# Patient Record
Sex: MALE | NOT HISPANIC OR LATINO | URBAN - METROPOLITAN AREA
[De-identification: names, ages, dates, MRNs, and addresses within clinical notes are randomized per-mention and may not be internally consistent; named-entity substitution may affect disease eponyms.]

---

## 2021-05-27 ENCOUNTER — RECORDS - HEALTHEAST (OUTPATIENT)
Dept: ADMINISTRATIVE | Facility: CLINIC | Age: 51
End: 2021-05-27

## 2023-02-20 ENCOUNTER — OFFICE VISIT (OUTPATIENT)
Dept: FAMILY MEDICINE | Facility: CLINIC | Age: 53
End: 2023-02-20

## 2023-02-20 VITALS
SYSTOLIC BLOOD PRESSURE: 154 MMHG | RESPIRATION RATE: 17 BRPM | WEIGHT: 153.3 LBS | OXYGEN SATURATION: 100 % | HEART RATE: 86 BPM | HEIGHT: 68 IN | BODY MASS INDEX: 23.23 KG/M2 | TEMPERATURE: 98.4 F | DIASTOLIC BLOOD PRESSURE: 83 MMHG

## 2023-02-20 DIAGNOSIS — F17.210 CIGARETTE NICOTINE DEPENDENCE WITHOUT COMPLICATION: ICD-10-CM

## 2023-02-20 DIAGNOSIS — Z78.9 LIVES IN GROUP HOME: ICD-10-CM

## 2023-02-20 DIAGNOSIS — Z12.11 SCREEN FOR COLON CANCER: ICD-10-CM
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
## 2023-02-20 DIAGNOSIS — Z87.898 HISTORY OF ALCOHOL USE DISORDER: ICD-10-CM

## 2023-02-20 DIAGNOSIS — Z11.3 ROUTINE SCREENING FOR STI (SEXUALLY TRANSMITTED INFECTION): ICD-10-CM

## 2023-02-20 DIAGNOSIS — H61.22 IMPACTED CERUMEN OF LEFT EAR: ICD-10-CM

## 2023-02-20 DIAGNOSIS — Z00.00 HEALTHCARE MAINTENANCE: Primary | ICD-10-CM

## 2023-02-20 DIAGNOSIS — Z12.5 SCREENING FOR PROSTATE CANCER: ICD-10-CM

## 2023-02-20 LAB
ALBUMIN SERPL BCG-MCNC: 4.7 G/DL (ref 3.5–5.2)
ALP SERPL-CCNC: 41 U/L (ref 40–129)
ALT SERPL W P-5'-P-CCNC: 28 U/L (ref 10–50)
ANION GAP SERPL CALCULATED.3IONS-SCNC: 14 MMOL/L (ref 7–15)
AST SERPL W P-5'-P-CCNC: 31 U/L (ref 10–50)
BASOPHILS # BLD AUTO: 0.1 10E3/UL (ref 0–0.2)
BASOPHILS NFR BLD AUTO: 1 %
BILIRUB SERPL-MCNC: 0.5 MG/DL
BUN SERPL-MCNC: 13.6 MG/DL (ref 6–20)
CALCIUM SERPL-MCNC: 10 MG/DL (ref 8.6–10)
CHLORIDE SERPL-SCNC: 103 MMOL/L (ref 98–107)
CREAT SERPL-MCNC: 0.89 MG/DL (ref 0.67–1.17)
DEPRECATED HCO3 PLAS-SCNC: 22 MMOL/L (ref 22–29)
EOSINOPHIL # BLD AUTO: 0.1 10E3/UL (ref 0–0.7)
EOSINOPHIL NFR BLD AUTO: 3 %
ERYTHROCYTE [DISTWIDTH] IN BLOOD BY AUTOMATED COUNT: 13.9 % (ref 10–15)
GFR SERPL CREATININE-BSD FRML MDRD: >90 ML/MIN/1.73M2
GLUCOSE SERPL-MCNC: 93 MG/DL (ref 70–99)
HCT VFR BLD AUTO: 40.5 % (ref 40–53)
HGB BLD-MCNC: 13.1 G/DL (ref 13.3–17.7)
IMM GRANULOCYTES # BLD: 0 10E3/UL
IMM GRANULOCYTES NFR BLD: 0 %
LYMPHOCYTES # BLD AUTO: 1.5 10E3/UL (ref 0.8–5.3)
LYMPHOCYTES NFR BLD AUTO: 39 %
MCH RBC QN AUTO: 31.9 PG (ref 26.5–33)
MCHC RBC AUTO-ENTMCNC: 32.3 G/DL (ref 31.5–36.5)
MCV RBC AUTO: 99 FL (ref 78–100)
MONOCYTES # BLD AUTO: 0.3 10E3/UL (ref 0–1.3)
MONOCYTES NFR BLD AUTO: 9 %
NEUTROPHILS # BLD AUTO: 1.8 10E3/UL (ref 1.6–8.3)
NEUTROPHILS NFR BLD AUTO: 48 %
NRBC # BLD AUTO: 0 10E3/UL
NRBC BLD AUTO-RTO: 0 /100
PLATELET # BLD AUTO: 381 10E3/UL (ref 150–450)
POTASSIUM SERPL-SCNC: 4.1 MMOL/L (ref 3.4–5.3)
PROT SERPL-MCNC: 7.8 G/DL (ref 6.4–8.3)
PSA SERPL-MCNC: 3.99 NG/ML (ref 0–3.5)
RBC # BLD AUTO: 4.11 10E6/UL (ref 4.4–5.9)
SODIUM SERPL-SCNC: 139 MMOL/L (ref 136–145)
WBC # BLD AUTO: 3.7 10E3/UL (ref 4–11)

## 2023-02-20 PROCEDURE — 87340 HEPATITIS B SURFACE AG IA: CPT | Performed by: NURSE PRACTITIONER

## 2023-02-20 PROCEDURE — 86708 HEPATITIS A ANTIBODY: CPT | Performed by: NURSE PRACTITIONER

## 2023-02-20 PROCEDURE — 86780 TREPONEMA PALLIDUM: CPT | Performed by: NURSE PRACTITIONER

## 2023-02-20 PROCEDURE — 86803 HEPATITIS C AB TEST: CPT | Performed by: NURSE PRACTITIONER

## 2023-02-20 PROCEDURE — 87389 HIV-1 AG W/HIV-1&-2 AB AG IA: CPT | Performed by: NURSE PRACTITIONER

## 2023-02-20 PROCEDURE — 86709 HEPATITIS A IGM ANTIBODY: CPT | Performed by: NURSE PRACTITIONER

## 2023-02-20 PROCEDURE — 86481 TB AG RESPONSE T-CELL SUSP: CPT | Performed by: NURSE PRACTITIONER

## 2023-02-20 PROCEDURE — 87491 CHLMYD TRACH DNA AMP PROBE: CPT | Performed by: NURSE PRACTITIONER

## 2023-02-20 PROCEDURE — 80053 COMPREHEN METABOLIC PANEL: CPT | Performed by: NURSE PRACTITIONER

## 2023-02-20 PROCEDURE — 87591 N.GONORRHOEAE DNA AMP PROB: CPT | Performed by: NURSE PRACTITIONER

## 2023-02-20 PROCEDURE — 85025 COMPLETE CBC W/AUTO DIFF WBC: CPT | Performed by: NURSE PRACTITIONER

## 2023-02-20 PROCEDURE — 86706 HEP B SURFACE ANTIBODY: CPT | Performed by: NURSE PRACTITIONER

## 2023-02-20 PROCEDURE — 86704 HEP B CORE ANTIBODY TOTAL: CPT | Performed by: NURSE PRACTITIONER

## 2023-02-20 PROCEDURE — G0103 PSA SCREENING: HCPCS | Performed by: NURSE PRACTITIONER

## 2023-02-20 ASSESSMENT — PATIENT HEALTH QUESTIONNAIRE - PHQ9
SUM OF ALL RESPONSES TO PHQ QUESTIONS 1-9: 4
5. POOR APPETITE OR OVEREATING: MORE THAN HALF THE DAYS

## 2023-02-20 ASSESSMENT — ANXIETY QUESTIONNAIRES
3. WORRYING TOO MUCH ABOUT DIFFERENT THINGS: MORE THAN HALF THE DAYS
IF YOU CHECKED OFF ANY PROBLEMS ON THIS QUESTIONNAIRE, HOW DIFFICULT HAVE THESE PROBLEMS MADE IT FOR YOU TO DO YOUR WORK, TAKE CARE OF THINGS AT HOME, OR GET ALONG WITH OTHER PEOPLE: SOMEWHAT DIFFICULT
GAD7 TOTAL SCORE: 8
1. FEELING NERVOUS, ANXIOUS, OR ON EDGE: MORE THAN HALF THE DAYS
7. FEELING AFRAID AS IF SOMETHING AWFUL MIGHT HAPPEN: NOT AT ALL
GAD7 TOTAL SCORE: 8
6. BECOMING EASILY ANNOYED OR IRRITABLE: NOT AT ALL
5. BEING SO RESTLESS THAT IT IS HARD TO SIT STILL: NOT AT ALL
2. NOT BEING ABLE TO STOP OR CONTROL WORRYING: MORE THAN HALF THE DAYS

## 2023-02-20 ASSESSMENT — ENCOUNTER SYMPTOMS
GASTROINTESTINAL NEGATIVE: 1
FEVER: 0
DIZZINESS: 0
EYES NEGATIVE: 1
RESPIRATORY NEGATIVE: 1
HEADACHES: 0
SORE THROAT: 0
ALLERGIC/IMMUNOLOGIC NEGATIVE: 1
MUSCULOSKELETAL NEGATIVE: 1
CHILLS: 0
ENDOCRINE NEGATIVE: 1
CARDIOVASCULAR NEGATIVE: 1

## 2023-02-20 ASSESSMENT — PAIN SCALES - GENERAL: PAINLEVEL: NO PAIN (0)
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
## 2023-02-20 NOTE — NURSING NOTE
"ROOM:2  REBECCA MCKEON    Preferred Name: Levar     How did you hear about us?  Other - RS Louisa    52 year old  Chief Complaint   Patient presents with     Physical     Just the physical no major concerns       Blood pressure (!) 153/90, pulse 86, temperature 98.4  F (36.9  C), temperature source Oral, resp. rate 17, height 1.735 m (5' 8.3\"), weight 69.5 kg (153 lb 4.8 oz), SpO2 100 %. Body mass index is 23.1 kg/m .  BP completed using cuff size:        There is no problem list on file for this patient.      Wt Readings from Last 2 Encounters:   02/20/23 69.5 kg (153 lb 4.8 oz)     BP Readings from Last 3 Encounters:   02/20/23 (!) 153/90       No Known Allergies    No current outpatient medications on file.     No current facility-administered medications for this visit.       Social History     Tobacco Use     Smoking status: Some Days     Types: Cigarettes     Smokeless tobacco: Never   Vaping Use     Vaping Use: Never used   Substance Use Topics     Alcohol use: Not Currently     Drug use: Not Currently       Honoring Choices - Health Care Directive Guide offered to patient at time of visit.    There are no preventive care reminders to display for this patient.      There is no immunization history on file for this patient.    No results found for: PAP    No lab results found.    PHQ-2 ( 1999 Pfizer) 2/20/2023   Q1: Little interest or pleasure in doing things 0   Q2: Feeling down, depressed or hopeless 2   PHQ-2 Score 2       PHQ-9 SCORE 2/20/2023   PHQ-9 Total Score 4       SIOMARA-7 SCORE 2/20/2023   Total Score 8       No flowsheet data found.    Rose Marie Roldan, EMT    February 20, 2023 10:10 AM    "

## 2023-02-21 LAB
C TRACH DNA SPEC QL NAA+PROBE: NEGATIVE
HAV IGG SER QL IA: NONREACTIVE
HAV IGM SERPL QL IA: NONREACTIVE
HBV CORE AB SERPL QL IA: NONREACTIVE
HBV SURFACE AB SERPL IA-ACNC: 0.61 M[IU]/ML
HBV SURFACE AB SERPL IA-ACNC: NONREACTIVE M[IU]/ML
HBV SURFACE AG SERPL QL IA: NONREACTIVE
HCV AB SERPL QL IA: NONREACTIVE
HIV 1+2 AB+HIV1 P24 AG SERPL QL IA: NONREACTIVE
N GONORRHOEA DNA SPEC QL NAA+PROBE: NEGATIVE
T PALLIDUM AB SER QL: NONREACTIVE

## 2023-02-21 NOTE — RESULT ENCOUNTER NOTE
Hi all,    This patient will need follow-up on 3/2/23 at the [facility]- thanks!

## 2023-02-22 LAB
GAMMA INTERFERON BACKGROUND BLD IA-ACNC: 0.02 IU/ML
M TB IFN-G BLD-IMP: NEGATIVE
M TB IFN-G CD4+ BCKGRND COR BLD-ACNC: 9.98 IU/ML
MITOGEN IGNF BCKGRD COR BLD-ACNC: 0.02 IU/ML
MITOGEN IGNF BCKGRD COR BLD-ACNC: 0.06 IU/ML
QUANTIFERON MITOGEN: 10 IU/ML
QUANTIFERON NIL TUBE: 0.02 IU/ML
QUANTIFERON TB1 TUBE: 0.04 IU/ML
QUANTIFERON TB2 TUBE: 0.08

## 2023-03-02 ENCOUNTER — OFFICE VISIT (OUTPATIENT)
Dept: FAMILY MEDICINE | Facility: CLINIC | Age: 53
End: 2023-03-02

## 2023-03-02 VITALS
RESPIRATION RATE: 18 BRPM | DIASTOLIC BLOOD PRESSURE: 86 MMHG | HEART RATE: 80 BPM | SYSTOLIC BLOOD PRESSURE: 138 MMHG | OXYGEN SATURATION: 99 %

## 2023-03-02 DIAGNOSIS — R97.20 ELEVATED PROSTATE SPECIFIC ANTIGEN (PSA): Primary | ICD-10-CM

## 2023-03-02 DIAGNOSIS — Z00.00 HEALTHCARE MAINTENANCE: ICD-10-CM

## 2023-03-02 DIAGNOSIS — R79.89 ABNORMAL COMPLETE BLOOD COUNT: ICD-10-CM

## 2023-03-02 DIAGNOSIS — R03.0 ELEVATED BP WITHOUT DIAGNOSIS OF HYPERTENSION: ICD-10-CM

## 2023-03-02 NOTE — PROGRESS NOTES
[facility] ACUTE OR FOLLOW UP APPOINTMENT    Patient: Levar Mclain YOB: 1970    Date of Exam: 3/02/23    Arrival Time: 09 32 AM  Departure Time: 09 41 AM    Please be advised that this client resides in a facility in which narcotic medications are not permitted. If pain management is needed, please prescribe an alternative medication.     Levar Mclain is a 52 year old who presents for the following: results, BP recheck.      HPI: 52-year-old male [facility] patient (past Drug of choice: cocaine (coke), marijuana, and alcohol) presents for BP check and lab review. Patient denies acute concerns/symptoms at time of exam.        Do you need any refills on your Medications today? No    Review of Systems   Constitutional, HEENT, cardiovascular, pulmonary, gi and gu systems are negative, except as otherwise noted.      General Physical Exam:  Vitals: /86 (BP Location: Right arm)   Pulse 80   Resp 18   SpO2 99%       Physical Exam  Constitutional:       General: He is not in acute distress.     Appearance: He is not ill-appearing.   Eyes:      Extraocular Movements: Extraocular movements intact.   Cardiovascular:      Rate and Rhythm: Normal rate.   Pulmonary:      Effort: Pulmonary effort is normal. No respiratory distress.   Musculoskeletal:      Cervical back: Neck supple.   Neurological:      General: No focal deficit present.      Mental Status: He is alert.   Psychiatric:         Thought Content: Thought content normal.         Judgment: Judgment normal.             If prescribed a controlled substance, may client take medication home when discharged from [facility]? No.    Additional Comments:N/A      Recent Results (from the past 504 hour(s))   Comprehensive metabolic panel    Collection Time: 02/20/23 11:19 AM   Result Value Ref Range    Sodium 139 136 - 145 mmol/L    Potassium 4.1 3.4 - 5.3 mmol/L    Chloride 103 98 - 107 mmol/L    Carbon Dioxide (CO2) 22 22 - 29 mmol/L    Anion Gap 14 7 - 15  mmol/L    Urea Nitrogen 13.6 6.0 - 20.0 mg/dL    Creatinine 0.89 0.67 - 1.17 mg/dL    Calcium 10.0 8.6 - 10.0 mg/dL    Glucose 93 70 - 99 mg/dL    Alkaline Phosphatase 41 40 - 129 U/L    AST 31 10 - 50 U/L    ALT 28 10 - 50 U/L    Protein Total 7.8 6.4 - 8.3 g/dL    Albumin 4.7 3.5 - 5.2 g/dL    Bilirubin Total 0.5 <=1.2 mg/dL    GFR Estimate >90 >60 mL/min/1.73m2   HIV Antigen Antibody Combo    Collection Time: 02/20/23 11:19 AM   Result Value Ref Range    HIV Antigen Antibody Combo Nonreactive Nonreactive   Hepatitis C Screen Reflex to HCV RNA Quant and Genotype    Collection Time: 02/20/23 11:19 AM   Result Value Ref Range    Hepatitis C Antibody Nonreactive Nonreactive   Hepatitis B core antibody    Collection Time: 02/20/23 11:19 AM   Result Value Ref Range    Hepatitis B Core Antibody Total Nonreactive Nonreactive   Hepatitis B surface antigen    Collection Time: 02/20/23 11:19 AM   Result Value Ref Range    Hepatitis B Surface Antigen Nonreactive Nonreactive   Hepatitis B Surface Antibody    Collection Time: 02/20/23 11:19 AM   Result Value Ref Range    Hepatitis B Surface Antibody Instrument Value 0.61 <8.00 m[IU]/mL    Hepatitis B Surface Antibody Nonreactive    Hepatitis A Antibody IgG    Collection Time: 02/20/23 11:19 AM   Result Value Ref Range    Hepatitis A Antibody IgG Nonreactive Nonreactive   Hepatitis A antibody IgM    Collection Time: 02/20/23 11:19 AM   Result Value Ref Range    Hepatitis A Antibody IgM Nonreactive Nonreactive   NEISSERIA GONORRHOEA PCR    Collection Time: 02/20/23 11:19 AM    Specimen: Urine, Voided   Result Value Ref Range    Neisseria gonorrhoeae Negative Negative   CHLAMYDIA TRACHOMATIS PCR    Collection Time: 02/20/23 11:19 AM    Specimen: Urine, Voided   Result Value Ref Range    Chlamydia trachomatis Negative Negative   Treponema Abs w Reflex to RPR and Titer    Collection Time: 02/20/23 11:19 AM   Result Value Ref Range    Treponema Antibody Total Nonreactive Nonreactive    Prostate spec antigen screen    Collection Time: 02/20/23 11:19 AM   Result Value Ref Range    Prostate Specific Antigen Screen 3.99 (H) 0.00 - 3.50 ng/mL   CBC with platelets and differential    Collection Time: 02/20/23 11:19 AM   Result Value Ref Range    WBC Count 3.7 (L) 4.0 - 11.0 10e3/uL    RBC Count 4.11 (L) 4.40 - 5.90 10e6/uL    Hemoglobin 13.1 (L) 13.3 - 17.7 g/dL    Hematocrit 40.5 40.0 - 53.0 %    MCV 99 78 - 100 fL    MCH 31.9 26.5 - 33.0 pg    MCHC 32.3 31.5 - 36.5 g/dL    RDW 13.9 10.0 - 15.0 %    Platelet Count 381 150 - 450 10e3/uL    % Neutrophils 48 %    % Lymphocytes 39 %    % Monocytes 9 %    % Eosinophils 3 %    % Basophils 1 %    % Immature Granulocytes 0 %    NRBCs per 100 WBC 0 <1 /100    Absolute Neutrophils 1.8 1.6 - 8.3 10e3/uL    Absolute Lymphocytes 1.5 0.8 - 5.3 10e3/uL    Absolute Monocytes 0.3 0.0 - 1.3 10e3/uL    Absolute Eosinophils 0.1 0.0 - 0.7 10e3/uL    Absolute Basophils 0.1 0.0 - 0.2 10e3/uL    Absolute Immature Granulocytes 0.0 <=0.4 10e3/uL    Absolute NRBCs 0.0 10e3/uL   Quantiferon TB Gold Plus Grey Tube    Collection Time: 02/20/23 11:19 AM    Specimen: Peripheral Blood   Result Value Ref Range    Quantiferon Nil Tube 0.02 IU/mL   Quantiferon TB Gold Plus Green Tube    Collection Time: 02/20/23 11:19 AM    Specimen: Peripheral Blood   Result Value Ref Range    Quantiferon TB1 Tube 0.04 IU/mL   Quantiferon TB Gold Plus Yellow Tube    Collection Time: 02/20/23 11:19 AM    Specimen: Peripheral Blood   Result Value Ref Range    Quantiferon TB2 Tube 0.08    Quantiferon TB Gold Plus Purple Tube    Collection Time: 02/20/23 11:19 AM    Specimen: Peripheral Blood   Result Value Ref Range    Quantiferon Mitogen 10.00 IU/mL   Quantiferon TB Gold Plus    Collection Time: 02/20/23 11:19 AM    Specimen: Peripheral Blood   Result Value Ref Range    Quantiferon-TB Gold Plus Negative Negative    TB1 Ag minus Nil Value 0.02 IU/mL    TB2 Ag minus Nil Value 0.06 IU/mL    Mitogen minus  Nil Result 9.98 IU/mL    Nil Result 0.02 IU/mL         Assessment / Plan:  1. Elevated prostate specific antigen (PSA)  Pt endorses having intercourse 2-3 days prior to PSA draw. He denies urinary hesitancy/frequency/dribbling and hematuria today. Will have him repeat the PSA in 2 months. Instructed patient to not have intercourse/ejaculate 4 days prior to repeat PSA. Disposition pending results.    - Prostate spec antigen screen; Future    2. Elevated BP without diagnosis of hypertension  BP closer to goal. Advised cardio/low Na diet.    3. Abnormal complete blood count  Pt denies symptoms including fevers, night sweats, fatigue etc. Will recheck CBC in 2 months.  - CBC with platelets differential; Future    4. Healthcare maintenance  Advised Hepatitis A/B vaccines.      Referrals Made:   NO REFERRALS MADE TODAY  If a referral was made to a [facility] and you don't get a call from central scheduling please call [phone].  If a Mammogram was ordered for you at [facility] call [phone] to schedule or change your appointment.  If you had an XRay/CT/Ultrasound/MRI ordered the number is [phone] to schedule or change your radiology appointment.     Follow-up in 2 months for repeat labs at [facility] clinic, sooner if needed    Medication changes made at today's visit: MEDICATIONS:        - Continue other medications without change    All questions/concerns addressed. Patient stated understanding/agreement to plan of care.    KITA Pryor, CNP  [facility] [employer]

## 2023-03-02 NOTE — PATIENT INSTRUCTIONS
Recent Results (from the past 504 hour(s))   Comprehensive metabolic panel    Collection Time: 02/20/23 11:19 AM   Result Value Ref Range    Sodium 139 136 - 145 mmol/L    Potassium 4.1 3.4 - 5.3 mmol/L    Chloride 103 98 - 107 mmol/L    Carbon Dioxide (CO2) 22 22 - 29 mmol/L    Anion Gap 14 7 - 15 mmol/L    Urea Nitrogen 13.6 6.0 - 20.0 mg/dL    Creatinine 0.89 0.67 - 1.17 mg/dL    Calcium 10.0 8.6 - 10.0 mg/dL    Glucose 93 70 - 99 mg/dL    Alkaline Phosphatase 41 40 - 129 U/L    AST 31 10 - 50 U/L    ALT 28 10 - 50 U/L    Protein Total 7.8 6.4 - 8.3 g/dL    Albumin 4.7 3.5 - 5.2 g/dL    Bilirubin Total 0.5 <=1.2 mg/dL    GFR Estimate >90 >60 mL/min/1.73m2   HIV Antigen Antibody Combo    Collection Time: 02/20/23 11:19 AM   Result Value Ref Range    HIV Antigen Antibody Combo Nonreactive Nonreactive   Hepatitis C Screen Reflex to HCV RNA Quant and Genotype    Collection Time: 02/20/23 11:19 AM   Result Value Ref Range    Hepatitis C Antibody Nonreactive Nonreactive   Hepatitis B core antibody    Collection Time: 02/20/23 11:19 AM   Result Value Ref Range    Hepatitis B Core Antibody Total Nonreactive Nonreactive   Hepatitis B surface antigen    Collection Time: 02/20/23 11:19 AM   Result Value Ref Range    Hepatitis B Surface Antigen Nonreactive Nonreactive   Hepatitis B Surface Antibody    Collection Time: 02/20/23 11:19 AM   Result Value Ref Range    Hepatitis B Surface Antibody Instrument Value 0.61 <8.00 m[IU]/mL    Hepatitis B Surface Antibody Nonreactive    Hepatitis A Antibody IgG    Collection Time: 02/20/23 11:19 AM   Result Value Ref Range    Hepatitis A Antibody IgG Nonreactive Nonreactive   Hepatitis A antibody IgM    Collection Time: 02/20/23 11:19 AM   Result Value Ref Range    Hepatitis A Antibody IgM Nonreactive Nonreactive   NEISSERIA GONORRHOEA PCR    Collection Time: 02/20/23 11:19 AM    Specimen: Urine, Voided   Result Value Ref Range    Neisseria gonorrhoeae Negative Negative   CHLAMYDIA  TRACHOMATIS PCR    Collection Time: 02/20/23 11:19 AM    Specimen: Urine, Voided   Result Value Ref Range    Chlamydia trachomatis Negative Negative   Treponema Abs w Reflex to RPR and Titer    Collection Time: 02/20/23 11:19 AM   Result Value Ref Range    Treponema Antibody Total Nonreactive Nonreactive   Prostate spec antigen screen    Collection Time: 02/20/23 11:19 AM   Result Value Ref Range    Prostate Specific Antigen Screen 3.99 (H) 0.00 - 3.50 ng/mL   CBC with platelets and differential    Collection Time: 02/20/23 11:19 AM   Result Value Ref Range    WBC Count 3.7 (L) 4.0 - 11.0 10e3/uL    RBC Count 4.11 (L) 4.40 - 5.90 10e6/uL    Hemoglobin 13.1 (L) 13.3 - 17.7 g/dL    Hematocrit 40.5 40.0 - 53.0 %    MCV 99 78 - 100 fL    MCH 31.9 26.5 - 33.0 pg    MCHC 32.3 31.5 - 36.5 g/dL    RDW 13.9 10.0 - 15.0 %    Platelet Count 381 150 - 450 10e3/uL    % Neutrophils 48 %    % Lymphocytes 39 %    % Monocytes 9 %    % Eosinophils 3 %    % Basophils 1 %    % Immature Granulocytes 0 %    NRBCs per 100 WBC 0 <1 /100    Absolute Neutrophils 1.8 1.6 - 8.3 10e3/uL    Absolute Lymphocytes 1.5 0.8 - 5.3 10e3/uL    Absolute Monocytes 0.3 0.0 - 1.3 10e3/uL    Absolute Eosinophils 0.1 0.0 - 0.7 10e3/uL    Absolute Basophils 0.1 0.0 - 0.2 10e3/uL    Absolute Immature Granulocytes 0.0 <=0.4 10e3/uL    Absolute NRBCs 0.0 10e3/uL   Quantiferon TB Gold Plus Grey Tube    Collection Time: 02/20/23 11:19 AM    Specimen: Peripheral Blood   Result Value Ref Range    Quantiferon Nil Tube 0.02 IU/mL   Quantiferon TB Gold Plus Green Tube    Collection Time: 02/20/23 11:19 AM    Specimen: Peripheral Blood   Result Value Ref Range    Quantiferon TB1 Tube 0.04 IU/mL   Quantiferon TB Gold Plus Yellow Tube    Collection Time: 02/20/23 11:19 AM    Specimen: Peripheral Blood   Result Value Ref Range    Quantiferon TB2 Tube 0.08    Quantiferon TB Gold Plus Purple Tube    Collection Time: 02/20/23 11:19 AM    Specimen: Peripheral Blood   Result  Value Ref Range    Quantiferon Mitogen 10.00 IU/mL   Quantiferon TB Gold Plus    Collection Time: 02/20/23 11:19 AM    Specimen: Peripheral Blood   Result Value Ref Range    Quantiferon-TB Gold Plus Negative Negative    TB1 Ag minus Nil Value 0.02 IU/mL    TB2 Ag minus Nil Value 0.06 IU/mL    Mitogen minus Nil Result 9.98 IU/mL    Nil Result 0.02 IU/mL

## 2023-05-12 ENCOUNTER — TELEPHONE (OUTPATIENT)
Dept: GASTROENTEROLOGY | Facility: CLINIC | Age: 53
End: 2023-05-12
Payer: COMMERCIAL

## 2023-05-12 ENCOUNTER — HOSPITAL ENCOUNTER (OUTPATIENT)
Facility: AMBULATORY SURGERY CENTER | Age: 53
End: 2023-05-12
Attending: SURGERY | Admitting: SURGERY
Payer: COMMERCIAL

## 2023-05-12 NOTE — TELEPHONE ENCOUNTER
Screening Questions  BLUE  KIND OF PREP RED  LOCATION [review exclusion criteria] GREEN  SEDATION TYPE    Y  Are you active on mychart?   REBECCA MCKEON  Ordering/Referring Provider?    [facility]  What type of coverage do you have?  N Have you had a positive covid test in the last 14 days?    24.8  1. BMI  [BMI 40+ - review exclusion criteria - [facility] + [facility]]            *NEED PAC APPT AT [facility] + [facility] (ONLY)*     SELF   2. Are you able to give consent for your medical care? [IF NO,RN REVIEW]          N  3. Are you taking any prescription pain medications on a routine schedule   (ex narcotics: tramadol, oxycodone, roxicodone, oxycontin,  and percocet)?    [RN Review]             N  3a. EXTENDED PREP What kind of prescription?     N 4. Do you have any chemical dependencies such as alcohol, street drugs, or methadone?    **If yes 3- 5 , please schedule with MAC sedation.**          IF YES TO ANY 6 - 10 - HOSPITAL SETTING ONLY.     N 6.   Do you need assistance transferring?     N 7.   Have you had a heart or lung transplant?    N 8.   Are you currently on dialysis?   N 9.   Do you use daily home oxygen?   N 10. Do you take nitroglycerin?   10a. N If yes, how often?     11. [FEMALES]   Are you currently pregnant?     11a.  If yes, how many weeks? [ Greater than 12 weeks, OR NEEDED]    N 12. [review exclusion criteria]  Do you have any implantable devices in your body (pacemaker, defib, LVAD)?            *NEED PAC APPT AT [facility]*     N 13. Do you have Pulmonary Hypertension?             *NEED PAC APPT AT [facility]*     N 14. In the past 6 months, have you had any heart related issues including cardiomyopathy or heart attack?     N 14a. If yes, did it require cardiac stenting if so when?     N 15. Have you had a stroke or Transient ischemic attack (TIA - aka  mini stroke ) within 6 months?      N 16. Do you have mod to severe Obstructive Sleep Apnea?  [Hospital only]    N 17. Do you have SEVERE AND UNCONTROLLED asthma?               "*NEED PAC APPT AT [facility]*     N 18.Do you take blood thinners?  No    N 19. Do you take the medication Phentermine?    19a. If yes, \"Hold for 7 days before procedure.  Please consult your prescribing provider if you have questions about holding this medication.\"     N  20. Do you have chronic kidney disease?      N  21. Do you have a diagnosis of diabetes?     N  22. On a regular basis do you go 3-5 days between bowel movements?     23. Preferred LOCAL Pharmacy for Pre Prescription    [ LIST ONLY ONE PHARMACY]     [facility] [place] [facility] - [place], [place] - [phone]        - CLOSING REMINDERS -    You will receive a call from a Nurse to review instructions and health history.  This assessment must be completed prior to your procedure.  Failure to complete the Nurse assessment may result in the procedure being cancelled.      On the day of your procedure, please designatean adult(s) who can drive you home stay with you for the next 24 hours. The medicines used in the exam will make you sleepy. You will not be able to drive.      You cannot take public transportation, ride share services, or non-medical taxi service without a responsible caregiver.  Medical transport services are allowed with the requirement that a responsible caregiver will receive you at your destination.  We require that drivers and caregivers are confirmed prior to your procedure.      - SCHEDULING DETAILS -      Hospital Setting Required & If yes, what is the exclusion? N & N     Additional comments:  MAC PER ORDER       KELVIN   Surgeon   07/21/2023  Date of Procedure  MAC PER ORDER  Sedation Type     N  PAC / Pre-op Required   Location  [facility]    Type of Procedure Scheduled  Lower Endoscopy [Colonoscopy]    Which Colonoscopy Prep was Sent?     MIRALAX GATORADE WITHOUT MAGNEISUM CITRATE           "

## 2023-06-23 ENCOUNTER — TELEPHONE (OUTPATIENT)
Dept: GASTROENTEROLOGY | Facility: CLINIC | Age: 53
End: 2023-06-23
Payer: COMMERCIAL

## 2023-06-23 NOTE — TELEPHONE ENCOUNTER
1ST ATT TO R/S     Reason for Reschedule/Cancellation (please be detailed, any staff messages or encounters to note?):     Provider block release (Goffredo)       Prior to reschedule please review:    Ordering Provider: Tal Marks APRN CNP    Sedation per order: DEEP/MAC      Does patient have any ASC Exclusions, please identify?: N      Notes on Cancelled Procedure:  1. Procedure:Lower Endoscopy [Colonoscopy]   2. Date: 7/21/2023  3. Location:[facility]; [address] [place] [address], [address], [place] [address]  4. Surgeon: KELVIN         Rescheduled:     NO - LVMTCB - AWAITING PT CALL BACK TO R/S.     **PT WILL NEED (MAC PER ORDER)   **ANY PROVIDER  **CASE IN [facility]

## 2023-08-01 NOTE — TELEPHONE ENCOUNTER
08/01 - 2ND ATT TO R/S  -     Reason for Reschedule/Cancellation (please be detailed, any staff messages or encounters to note?):      Provider block release (Kelvin)         Prior to reschedule please review:  Ordering Provider: Tal Marks APRN CNP  Sedation per order: DEEP/MAC    Does patient have any ASC Exclusions, please identify?: N        Notes on Cancelled Procedure:  Procedure:Lower Endoscopy [Colonoscopy]   Date: 7/21/2023  [facility] [address] [place] [address], [address] Floor, [place] [address]  Surgeon: KELVIN            Rescheduled:      NO - INVALID PHONE NUMBERS  - WILL TRY 3RD ATT IN 48 HOURS      **PT WILL NEED (MAC PER ORDER)   **ANY PROVIDER  **CASE IN [facility]